# Patient Record
Sex: MALE | URBAN - METROPOLITAN AREA
[De-identification: names, ages, dates, MRNs, and addresses within clinical notes are randomized per-mention and may not be internally consistent; named-entity substitution may affect disease eponyms.]

---

## 2017-05-11 NOTE — PATIENT DISCUSSION
Considering  Surgery Counseling: I have discussed the option of glasses versus cataract surgery versus following . It was explained that when vision no longer meets the patient&rsquo;s visual needs and a new prescription for glasses is not likely to satisfy the patient, the option of cataract surgery is a reasonable next step. It was explained that there is no guarantee that removing the cataract will improve their visual symptoms, however; it is believed that the cataract is contributing to the patient's visual impairment and surgery may significantly improve both the visual and functional status of the patient. The risks, benefits and alternatives of surgery were discussed with the patient. After this discussion, the patient desires to consider options.

## 2017-05-11 NOTE — PATIENT DISCUSSION
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 TREAT MGD FIRST THEN CONSIDER OTHER TREATMENT OPTIONS IF SYMPTOMS PERSIST.

## 2017-05-11 NOTE — PATIENT DISCUSSION
Continue: Systane (peg 400-propylene glycol): drops: 0.4-0.3% 1 drop three times a day as directed into both eyes

## 2017-11-27 NOTE — PATIENT DISCUSSION
CATARACT, OU - SLOWLY PROGRESSING. PT NOT QUITE READY FOR SX. UPDATED GLS RX GIVEN FOR PT TO FILL IF DESIRES. FOLLOW.

## 2018-01-09 NOTE — PATIENT DISCUSSION
Cataracts Glasses Given: It was explained that when the vision no longer meets the patient?s needs, and when a new prescription for glasses does not improve visual satisfaction, that the option of cataract surgery is a reasonable next step. It has been determined from manifest refraction and vision testing that a new prescription for glasses may help improve the visual symptoms somewhat but it is not known if they will provide vision that is satisfactory. A new prescription for glasses was provided to the patient and we will re-evaluate in 6 MONTHS.

## 2018-01-09 NOTE — PATIENT DISCUSSION
CATARACTS, OU - GLASSES RX GIVEN TODAY. CONSIDER SURGERY IF GLASSES DO NOT PROVIDE SATISFACTORY VISION.  RTC IN 6 MONTHS

## 2019-02-04 NOTE — PATIENT DISCUSSION
Dry Eye Syndrome (HOSSEIN) Counseling: Dry Eye Syndrome, also known as Keratoconjunctivitis Sicca, is a common condition that occurs when your tears are not able to provide adequate lubrication for your eyes. Symptoms can be exacerbated by environmental factors such as smoke, wind, or prolonged eye use. Treatment options include, but are not limited to, artificial tears, punctal plugs, Restasis, oral omega-3 supplements, and lubricating ointments. I have explained to the patient that successful management is dependent on patient compliance with treatment as prescribed and/or the treatment regimen.

## 2019-08-05 NOTE — PATIENT DISCUSSION
CATARACTS, OU - SLOWYLY PROGRESSING, NOT VISUALLY SIGNIFICANT. DISC OPTION OF [id number]. GLASSES RX GIVEN TO FILL IF DESIRES.

## 2020-02-17 NOTE — PATIENT DISCUSSION
CATARACT, OU - SLOWLY PROGRESSING OU.  PATIENT WOULD LIKE TO HOLD OFF ON SURGERY FOR NOW AND FEELS HER VISION IS ADEQUATE WITH GLASSES

## 2020-06-29 NOTE — PATIENT DISCUSSION
Lens Treatment: Spoke to patient regarding below. Patient verbalized understanding and had no further questions.

## 2020-08-17 NOTE — PATIENT DISCUSSION
CATARACTS, OU- SLOWLY PROGRESSING. PT CHOOSES TO WAIT. NOT VISUALLY SIGNIFICANT. TOLERATING VISION WITH GLASSES AND NEVER FILLED LAST PRESCRIPTION. [facility] GLASSES RX GIVEN TO FILL. CONSIDER SURGERY ONCE GLASSES DO NOT PROVIDE ADEQUATE VISION. FOLLOW UP IN 6 MONTHS.

## 2021-03-18 ENCOUNTER — IMPORTED ENCOUNTER (OUTPATIENT)
Dept: URBAN - METROPOLITAN AREA CLINIC 9 | Facility: CLINIC | Age: 81
End: 2021-03-18

## 2021-03-18 PROBLEM — Z96.1: Noted: 2021-03-18

## 2021-03-18 PROBLEM — H40.1111: Noted: 2021-03-18

## 2021-03-18 PROBLEM — H40.1131: Noted: 2021-03-18

## 2021-03-18 PROBLEM — H40.1122: Noted: 2021-03-18

## 2021-05-10 NOTE — PATIENT DISCUSSION
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 REFER TO Kenney Sepulveda FOR EVALUATION AND CLEARANCE PRIOR TO CATARACT SURGERY INCLUDING A MODIFIER FOR PRE AND POST OP CATARACT CARE.

## 2021-05-10 NOTE — PATIENT DISCUSSION
CATARACTS OU - VISUALLY SIGNIFICANT. PT TO SEE  [facility] FOR CLEARANCE PRIOR TO CATARACT SURGERY. FOLLOW UP IN 6 WEEKS FOR FULL CT.

## 2021-06-21 NOTE — PATIENT DISCUSSION
DISCUSSED BENEFIT FROM DEV DUE TO FLEXIBILITY OF ADJUSTING THE IOL UP TO 3 TIMES AFTER SURGERY. PATIENT UNDERSTANDS THE NEED FOR FULL TIME UV PROTECTIVE GLASSES UNTIL 24 HOURS AFTER THE IOL IS LOCKED.  DISCUSSED DISTANCE VISION AND PATIENT WERE GLASSES FOR NEAR VISION

## 2021-06-21 NOTE — PATIENT DISCUSSION
The RxSight Light Adjustable Lens (RxLAL) is similar to other intraocular lenses (IOLs) that can be implanted in the eye to replace the natural lens that is removed during cataract surgery. While all IOLs improve vision after cataract surgery, most patients will require glasses (or contact lenses) to improve their vision to the level required for driving or reading. The RxLAL reduces the need for glasses or contact lenses by being able to change its focusing power after it is implanted in the eye. The focusing power of the RxLAL is adjusted by specific patterns of ultraviolet (UV) light produced by the RxSight Light Delivery Device (LDD), an instrument that the doctor uses in the office beginning 2-3 weeks after cataract surgery. Up to three light adjustment treatments can be performed to improve the patients vision, with a separation of 3 days between treatments. When the patient and doctor are satisfied with the vision, the same LDD is used to lockin the RxLAL and make the prescription permanent. From immediately after surgery until 24 hours after the completion of the lockin treatment, it was discussed that the need to protect the RxLAL from UV light in the environment by wearing DEV specific protective eyewear provided during all waking hours.

## 2021-08-17 NOTE — PATIENT DISCUSSION
CATARACT, OU - VISUALLY SIGNIFICANT OD &gt; OS. SCHEDULE SX OU. ANSWERED ALL OF PATIENT'S QUESTIONS REGARDING CE TODAY. PER KOLE, ALL MEASUREMENTS STILL VALID AND WITHIN 90 DAY WINDOW.

## 2021-08-17 NOTE — PATIENT DISCUSSION
REFRACTIVE ERROR, OU - DISCUSSED OPTION OF CORRECTING AT THE TIME OF CATARACT SURGERY. PT UNDERSTANDS AND ELECTS TO CONSIDER THEIR OPTIONS. PATIENT WOULD LIKE STANDARD OU.

## 2021-10-16 ASSESSMENT — TONOMETRY
OD_IOP_MMHG: 15
OS_IOP_MMHG: 14

## 2021-10-16 ASSESSMENT — PACHYMETRY
OD_CT_UM: 549.0
OS_CT_UM: 552.0

## 2021-10-16 ASSESSMENT — VISUAL ACUITY
OS_CC: 20/20 SN
OD_CC: 20/20 SN

## 2022-08-19 ENCOUNTER — FOLLOW UP (OUTPATIENT)
Dept: URBAN - METROPOLITAN AREA CLINIC 15 | Facility: CLINIC | Age: 82
End: 2022-08-19

## 2022-08-19 DIAGNOSIS — Z96.1: ICD-10-CM

## 2022-08-19 DIAGNOSIS — H40.1122: ICD-10-CM

## 2022-08-19 DIAGNOSIS — H40.1111: ICD-10-CM

## 2022-08-19 PROCEDURE — 92133 CPTRZD OPH DX IMG PST SGM ON: CPT

## 2022-08-19 PROCEDURE — 92014 COMPRE OPH EXAM EST PT 1/>: CPT

## 2022-08-19 PROCEDURE — 92083 EXTENDED VISUAL FIELD XM: CPT

## 2022-08-19 ASSESSMENT — VISUAL ACUITY
OU_CC: 20/20
OD_CC: 20/20
OS_CC: 20/20

## 2022-08-19 ASSESSMENT — TONOMETRY
OS_IOP_MMHG: 13
OD_IOP_MMHG: 17

## 2025-04-25 ENCOUNTER — NEW PATIENT (OUTPATIENT)
Age: 85
End: 2025-04-25

## 2025-04-25 DIAGNOSIS — Z96.1: ICD-10-CM

## 2025-04-25 DIAGNOSIS — H52.203: ICD-10-CM

## 2025-04-25 DIAGNOSIS — H52.4: ICD-10-CM

## 2025-04-25 DIAGNOSIS — H40.1131: ICD-10-CM

## 2025-04-25 DIAGNOSIS — H52.03: ICD-10-CM

## 2025-04-25 PROCEDURE — 92015 DETERMINE REFRACTIVE STATE: CPT

## 2025-04-25 PROCEDURE — 92014 COMPRE OPH EXAM EST PT 1/>: CPT
